# Patient Record
Sex: MALE | Race: WHITE | Employment: UNEMPLOYED | URBAN - METROPOLITAN AREA
[De-identification: names, ages, dates, MRNs, and addresses within clinical notes are randomized per-mention and may not be internally consistent; named-entity substitution may affect disease eponyms.]

---

## 2018-03-14 ENCOUNTER — APPOINTMENT (OUTPATIENT)
Dept: ULTRASOUND IMAGING | Facility: CLINIC | Age: 48
End: 2018-03-14
Attending: EMERGENCY MEDICINE
Payer: COMMERCIAL

## 2018-03-14 ENCOUNTER — HOSPITAL ENCOUNTER (EMERGENCY)
Facility: CLINIC | Age: 48
Discharge: HOME | End: 2018-03-15
Attending: EMERGENCY MEDICINE | Admitting: EMERGENCY MEDICINE
Payer: COMMERCIAL

## 2018-03-14 DIAGNOSIS — M79.661 RIGHT CALF PAIN: ICD-10-CM

## 2018-03-14 PROCEDURE — 93971 EXTREMITY STUDY: CPT | Mod: RT

## 2018-03-14 PROCEDURE — 99285 EMERGENCY DEPT VISIT HI MDM: CPT | Mod: 25

## 2018-03-14 RX ORDER — ACETAMINOPHEN 325 MG/1
650 TABLET ORAL ONCE
Status: COMPLETED | OUTPATIENT
Start: 2018-03-14 | End: 2018-03-15

## 2018-03-14 ASSESSMENT — ENCOUNTER SYMPTOMS: MYALGIAS: 1

## 2018-03-14 NOTE — LETTER
March 15, 2018      To Whom It May Concern:      Karson Ortiz was seen in our Emergency Department today, 03/15/18.  I expect his condition to improve over the next 2 days.  He may return to work/school when improved.    Sincerely,        Odell Perez RN

## 2018-03-14 NOTE — ED AVS SNAPSHOT
[facility] Emergency Department    [address]    [facility] [address]    Phone:  [phone]    Fax:  [phone]                                       Karson Ortiz   MRN: [id number]    Department:  [facility] Emergency Department   Date of Visit:  3/14/2018           Patient Information     Date Of Birth          1970        Your diagnoses for this visit were:     Right calf pain        You were seen by Sherie Bolanos MD.      Follow-up Information     Follow up with [facility], [place] In 3 days.    Specialty:  Family Practice        Follow up with [facility] Emergency Department.    Specialty:  EMERGENCY MEDICINE    Why:  If symptoms worsen    Contact information:    [address]  [facility] [address] [phone]        Discharge Instructions       Use Tylenol or ibuprofen for pain.    Rest and elevate the extremity.  Try ice as well.    Follow-up with your primary care provider, particularly if you are not having improvement in your pain.    Return to the emergency department with new or concerning symptoms.    Discharge References/Attachments     MYALGIAS (ENGLISH)    MUSCLE STRAIN, EXTREMITY (ENGLISH)      24 Hour Appointment Hotline       To make an appointment at any [facility] clinic, call [phone] ([phone]). If you don't have a family doctor or clinic, we will help you find one. [facility] clinics are conveniently located to serve the needs of you and your family.             Review of your medicines      Our records show that you are taking the medicines listed below. If these are incorrect, please call your family doctor or clinic.        Dose / Directions Last dose taken    ANDROGEL TD   Dose:  2 pump        Place 2 pumps onto the skin daily   Refills:  0        azithromycin 500 MG tablet   Commonly known as:  ZITHROMAX   Dose:  500 mg   Quantity:  3 tablet        Take 1 tablet (500 mg) by mouth daily    Refills:  0        cephALEXin 500 MG capsule   Commonly known as:  KEFLEX   Dose:  500 mg   Quantity:  30 capsule        Take 1 capsule (500 mg) by mouth 3 times daily   Refills:  0        diazepam 5 MG tablet   Commonly known as:  VALIUM   Dose:  5 mg   Quantity:  12 tablet        Take 1 tablet (5 mg) by mouth every 6 hours as needed (vertigo)   Refills:  0        GABAPENTIN PO   Dose:  300 mg        Take 300 mg by mouth 3 times daily   Refills:  0        oxyCODONE IR 5 MG tablet   Commonly known as:  ROXICODONE   Dose:  5-10 mg   Quantity:  140 tablet        Take 1-2 tablets (5-10 mg) by mouth every 4 hours as needed for pain (max 10/day)   Refills:  0        oxyCODONE-acetaminophen 5-325 MG per tablet   Commonly known as:  PERCOCET   Dose:  1-2 tablet   Quantity:  15 tablet        Take 1-2 tablets by mouth every 6 hours as needed for pain   Refills:  0        senna-docusate 8.6-50 MG per tablet   Commonly known as:  SENOKOT-S;PERICOLACE   Dose:  1-2 tablet   Quantity:  120 tablet        Take 1-2 tablets by mouth 2 times daily as needed for constipation   Refills:  1                Procedures and tests performed during your visit     Procedure/Test Number of Times Performed    Basic metabolic panel 1    CBC with platelets differential 2    CT Chest Pulmonary Embolism w Contrast 1    Cardiac Continuous Monitoring 1    D dimer quantitative 2    EKG 12-lead, tracing only 1    INR 2    Partial thromboplastin time 2    Peripheral IV catheter 1    Pulse oximetry nursing 1    Troponin I 1    US Lower Extremity Venous Duplex Right 1      Orders Needing Specimen Collection     None      Pending Results     Date and Time Order Name Status Description    3/14/2018 2346 EKG 12-lead, tracing only Preliminary             Pending Culture Results     No orders found for last 3 day(s).            Pending Results Instructions     If you had any lab results that were not finalized at the time of your Discharge, you can call the ED  Lab Result RN at [phone]. You will be contacted by this team for any positive Lab results or changes in treatment. The nurses are available 7 days a week from 10A to 6:30P.  You can leave a message 24 hours per day and they will return your call.        Test Results From Your Hospital Stay        3/15/2018  2:18 AM      Narrative     US LOWER EXTREMITY VENOUS DUPLEX RIGHT 3/15/2018 2:13 AM     HISTORY: Calf pain and history of clots.    TECHNIQUE: Venous Doppler US of the lower extremity.? Color flow and  spectral Doppler with waveform analysis performed.    COMPARISON: None.    FINDINGS: Ultrasound of the right leg demonstrates no deep vein  thrombus from the common femoral through popliteal veins or in the  visualized segments of posterior tibial or peroneal veins in the calf.        Impression     IMPRESSION: No DVT identified right leg.    ELKE PETER MD         3/15/2018  2:08 AM      Narrative     CT CHEST PULMONARY EMBOLISM W CONTRAST  3/15/2018 1:19 AM     HISTORY: Dyspnea. History of cancer, presenting with leg pain. History  of superficial venous thrombosis. Mild chest pain.    TECHNIQUE: Volumetric acquisition of the chest  after the  administration of 79 mL Isovue-370 IV contrast. Radiation dose for  this scan was reduced using automated exposure control, adjustment of  the mA and/or kV according to patient size, or iterative  reconstruction technique.     COMPARISON: 2/25/2014.    FINDINGS: No visualized pulmonary embolism. Normal caliber thoracic  aorta. No acute infiltrates, pleural effusions or pneumothorax. Slight  prominence of interstitial markings likely relating to a somewhat  shallow inspiration. Small left hilar lymph node is stable. Calcified  left hilar and subcarinal nodes. Again noted is a slightly lobulated  lesion involving the posterior pancreatic tail which may be minimally  larger measuring approximately 1.9 cm (previously 1.6 cm). This is  indeterminate and seen on series  4, image 122. A tiny hypodensity more  distally in the pancreatic tail is stable.        Impression     IMPRESSION:  1. No visualized pulmonary embolism or other acute findings in the  chest.  2. Indeterminate pancreatic lesion as previously noted. This may be  minimally more prominent.    ELKE PETER MD         3/15/2018 12:57 AM      Component Results     Component Value Ref Range & Units Status    Sodium 139 133 - 144 mmol/L Final    Potassium 3.9 3.4 - 5.3 mmol/L Final    Chloride 108 94 - 109 mmol/L Final    Carbon Dioxide 25 20 - 32 mmol/L Final    Anion Gap 6 3 - 14 mmol/L Final    Glucose 107 (H) 70 - 99 mg/dL Final    Urea Nitrogen 22 7 - 30 mg/dL Final    Creatinine 0.81 0.66 - 1.25 mg/dL Final    GFR Estimate >90 >60 mL/min/1.7m2 Final    Non  GFR Calc    GFR Estimate If Black >90 >60 mL/min/1.7m2 Final    African American GFR Calc    Calcium 8.4 (L) 8.5 - 10.1 mg/dL Final         3/15/2018 12:49 AM      Component Results     Component Value Ref Range & Units Status    WBC Canceled, Test credited 4.0 - 11.0 10e9/L Final    Unsatisfactory specimen - clotted    RBC Count Canceled, Test credited 4.4 - 5.9 10e12/L Final    Unsatisfactory specimen - clotted    Hemoglobin Canceled, Test credited 13.3 - 17.7 g/dL Final    Unsatisfactory specimen - clotted    Hematocrit Canceled, Test credited 40.0 - 53.0 % Final    Unsatisfactory specimen - clotted    MCV Canceled, Test credited 78 - 100 fl Final    Unsatisfactory specimen - clotted    MCH Canceled, Test credited 26.5 - 33.0 pg Final    Unsatisfactory specimen - clotted    MCHC Canceled, Test credited 31.5 - 36.5 g/dL Final    Unsatisfactory specimen - clotted    RDW Canceled, Test credited 10.0 - 15.0 % Final    Unsatisfactory specimen - clotted    Platelet Count Canceled, Test credited 150 - 450 10e9/L Final    Unsatisfactory specimen - clotted    Diff Method Canceled, Test credited  Final    Unsatisfactory specimen - clotted          3/15/2018  1:26 AM      Component Results     Component Value Ref Range & Units Status    INR Canceled, Test credited 0.86 - 1.14 Final    Unsatisfactory specimen - clotted         3/15/2018  1:26 AM      Component Results     Component Value Ref Range & Units Status    PTT Canceled, Test credited 22 - 37 sec Final    Unsatisfactory specimen - clotted         3/15/2018  1:26 AM      Component Results     Component Value Ref Range & Units Status    D Dimer Canceled, Test credited 0.0 - 0.50 ug/ml FEU Final    Unsatisfactory specimen - clotted         3/15/2018  1:09 AM      Component Results     Component Value Ref Range & Units Status    WBC 9.9 4.0 - 11.0 10e9/L Final    RBC Count 4.99 4.4 - 5.9 10e12/L Final    Hemoglobin 14.7 13.3 - 17.7 g/dL Final    Hematocrit 44.9 40.0 - 53.0 % Final    MCV 90 78 - 100 fl Final    MCH 29.5 26.5 - 33.0 pg Final    MCHC 32.7 31.5 - 36.5 g/dL Final    RDW 12.7 10.0 - 15.0 % Final    Platelet Count 344 150 - 450 10e9/L Final    Diff Method Automated Method  Final    % Neutrophils 57.7 % Final    % Lymphocytes 31.2 % Final    % Monocytes 8.7 % Final    % Eosinophils 1.6 % Final    % Basophils 0.4 % Final    % Immature Granulocytes 0.4 % Final    Nucleated RBCs 0 0 /100 Final    Absolute Neutrophil 5.7 1.6 - 8.3 10e9/L Final    Absolute Lymphocytes 3.1 0.8 - 5.3 10e9/L Final    Absolute Monocytes 0.9 0.0 - 1.3 10e9/L Final    Absolute Eosinophils 0.2 0.0 - 0.7 10e9/L Final    Absolute Basophils 0.0 0.0 - 0.2 10e9/L Final    Abs Immature Granulocytes 0.0 0 - 0.4 10e9/L Final    Absolute Nucleated RBC 0.0  Final         3/15/2018  1:54 AM      Component Results     Component Value Ref Range & Units Status    D Dimer 0.7 (H) 0.0 - 0.50 ug/ml FEU Final    This D-dimer assay is intended for use in conjunction with a clinical pretest   probability assessment model to exclude pulmonary embolism (PE) and deep   venous thrombosis (DVT) in outpatients suspected of PE or DVT. The cut-off    value is 0.5 ug/mL FEU.           3/15/2018  1:54 AM      Component Results     Component Value Ref Range & Units Status    INR 0.95 0.86 - 1.14 Final         3/15/2018  1:54 AM      Component Results     Component Value Ref Range & Units Status    PTT 28 22 - 37 sec Final         3/15/2018  1:49 AM      Component Results     Component Value Ref Range & Units Status    Troponin I ES <0.015 0.000 - 0.045 ug/L Final    The 99th percentile for upper reference range is 0.045 ug/L.  Troponin values   in the range of 0.045 - 0.120 ug/L may be associated with risks of adverse   clinical events.                  Clinical Quality Measure: Blood Pressure Screening     Your blood pressure was checked while you were in the emergency department today. The last reading we obtained was  BP: 123/78 . Please read the guidelines below about what these numbers mean and what you should do about them.  If your systolic blood pressure (the top number) is less than 120 and your diastolic blood pressure (the bottom number) is less than 80, then your blood pressure is normal. There is nothing more that you need to do about it.  If your systolic blood pressure (the top number) is 120-139 or your diastolic blood pressure (the bottom number) is 80-89, your blood pressure may be higher than it should be. You should have your blood pressure rechecked within a year by a primary care provider.  If your systolic blood pressure (the top number) is 140 or greater or your diastolic blood pressure (the bottom number) is 90 or greater, you may have high blood pressure. High blood pressure is treatable, but if left untreated over time it can put you at risk for heart attack, stroke, or kidney failure. You should have your blood pressure rechecked by a primary care provider within the next 4 weeks.  If your provider in the emergency department today gave you specific instructions to follow-up with your doctor or provider even sooner than that, you  "should follow that instruction and not wait for up to 4 weeks for your follow-up visit.        Thank you for choosing [facility]       Thank you for choosing [facility] for your care. Our goal is always to provide you with excellent care. Hearing back from our patients is one way we can continue to improve our services. Please take a few minutes to complete the written survey that you may receive in the mail after you visit with us. Thank you!        [employer] Information     [employer] lets you send messages to your doctor, view your test results, renew your prescriptions, schedule appointments and more. To sign up, go to [email or web address] . Click on \"Log in\" on the left side of the screen, which will take you to the Welcome page. Then click on \"Sign up Now\" on the right side of the page.     You will be asked to enter the access code listed below, as well as some personal information. Please follow the directions to create your username and password.     Your access code is: [email or web address]  Expires: 2018  2:42 AM     Your access code will  in 90 days. If you need help or a new code, please call your [facility] clinic or [phone].        Care EveryWhere ID     This is your Care EveryWhere ID. This could be used by other organizations to access your [facility] medical records  [id number]        Equal Access to Services     SEFERINO HOPKINS : Hadii trip Pacheco, waaxda luqadaha, qaybta kaalmada maycol, levy correa . So [facility] [phone].    ATENCIÓN: Si habla español, tiene a abrams disposición servicios gratuitos de asistencia lingüística. Llame al [phone].    We comply with applicable federal civil rights laws and [place] laws. We do not discriminate on the basis of race, color, national origin, age, disability, sex, sexual orientation, or gender identity.            After Visit Summary       This is your record. Keep this with you and show to your community " pharmacist(s) and doctor(s) at your next visit.

## 2018-03-14 NOTE — ED AVS SNAPSHOT
[facility] Emergency Department    [address]    [facility] [address]    Phone:  [phone]    Fax:  [phone]                                       Karson Ortiz   MRN: [id number]    Department:  [facility] Emergency Department   Date of Visit:  3/14/2018           After Visit Summary Signature Page     I have received my discharge instructions, and my questions have been answered. I have discussed any challenges I see with this plan with the nurse or doctor.    ..........................................................................................................................................  Patient/Patient Representative Signature      ..........................................................................................................................................  Patient Representative Print Name and Relationship to Patient    ..................................................               ................................................  Date                                            Time    ..........................................................................................................................................  Reviewed by Signature/Title    ...................................................              ..............................................  Date                                                            Time

## 2018-03-15 ENCOUNTER — APPOINTMENT (OUTPATIENT)
Dept: CT IMAGING | Facility: CLINIC | Age: 48
End: 2018-03-15
Attending: EMERGENCY MEDICINE
Payer: COMMERCIAL

## 2018-03-15 VITALS
HEART RATE: 101 BPM | BODY MASS INDEX: 34.36 KG/M2 | RESPIRATION RATE: 11 BRPM | TEMPERATURE: 98.2 F | WEIGHT: 240 LBS | HEIGHT: 70 IN | SYSTOLIC BLOOD PRESSURE: 123 MMHG | DIASTOLIC BLOOD PRESSURE: 78 MMHG | OXYGEN SATURATION: 95 %

## 2018-03-15 LAB
ANION GAP SERPL CALCULATED.3IONS-SCNC: 6 MMOL/L (ref 3–14)
APTT PPP: 28 SEC (ref 22–37)
APTT PPP: NORMAL SEC (ref 22–37)
BASOPHILS # BLD AUTO: 0 10E9/L (ref 0–0.2)
BASOPHILS NFR BLD AUTO: 0.4 %
BUN SERPL-MCNC: 22 MG/DL (ref 7–30)
CALCIUM SERPL-MCNC: 8.4 MG/DL (ref 8.5–10.1)
CHLORIDE SERPL-SCNC: 108 MMOL/L (ref 94–109)
CO2 SERPL-SCNC: 25 MMOL/L (ref 20–32)
CREAT SERPL-MCNC: 0.81 MG/DL (ref 0.66–1.25)
D DIMER PPP FEU-MCNC: 0.7 UG/ML FEU (ref 0–0.5)
D DIMER PPP FEU-MCNC: NORMAL UG/ML FEU (ref 0–0.5)
DIFFERENTIAL METHOD BLD: NORMAL
DIFFERENTIAL METHOD BLD: NORMAL
EOSINOPHIL # BLD AUTO: 0.2 10E9/L (ref 0–0.7)
EOSINOPHIL NFR BLD AUTO: 1.6 %
ERYTHROCYTE [DISTWIDTH] IN BLOOD BY AUTOMATED COUNT: 12.7 % (ref 10–15)
ERYTHROCYTE [DISTWIDTH] IN BLOOD BY AUTOMATED COUNT: NORMAL % (ref 10–15)
GFR SERPL CREATININE-BSD FRML MDRD: >90 ML/MIN/1.7M2
GLUCOSE SERPL-MCNC: 107 MG/DL (ref 70–99)
HCT VFR BLD AUTO: 44.9 % (ref 40–53)
HCT VFR BLD AUTO: NORMAL % (ref 40–53)
HGB BLD-MCNC: 14.7 G/DL (ref 13.3–17.7)
HGB BLD-MCNC: NORMAL G/DL (ref 13.3–17.7)
IMM GRANULOCYTES # BLD: 0 10E9/L (ref 0–0.4)
IMM GRANULOCYTES NFR BLD: 0.4 %
INR PPP: 0.95 (ref 0.86–1.14)
INR PPP: NORMAL (ref 0.86–1.14)
INTERPRETATION ECG - MUSE: NORMAL
LYMPHOCYTES # BLD AUTO: 3.1 10E9/L (ref 0.8–5.3)
LYMPHOCYTES NFR BLD AUTO: 31.2 %
MCH RBC QN AUTO: 29.5 PG (ref 26.5–33)
MCH RBC QN AUTO: NORMAL PG (ref 26.5–33)
MCHC RBC AUTO-ENTMCNC: 32.7 G/DL (ref 31.5–36.5)
MCHC RBC AUTO-ENTMCNC: NORMAL G/DL (ref 31.5–36.5)
MCV RBC AUTO: 90 FL (ref 78–100)
MCV RBC AUTO: NORMAL FL (ref 78–100)
MONOCYTES # BLD AUTO: 0.9 10E9/L (ref 0–1.3)
MONOCYTES NFR BLD AUTO: 8.7 %
NEUTROPHILS # BLD AUTO: 5.7 10E9/L (ref 1.6–8.3)
NEUTROPHILS NFR BLD AUTO: 57.7 %
NRBC # BLD AUTO: 0 10*3/UL
NRBC BLD AUTO-RTO: 0 /100
PLATELET # BLD AUTO: 344 10E9/L (ref 150–450)
PLATELET # BLD AUTO: NORMAL 10E9/L (ref 150–450)
POTASSIUM SERPL-SCNC: 3.9 MMOL/L (ref 3.4–5.3)
RBC # BLD AUTO: 4.99 10E12/L (ref 4.4–5.9)
RBC # BLD AUTO: NORMAL 10E12/L (ref 4.4–5.9)
SODIUM SERPL-SCNC: 139 MMOL/L (ref 133–144)
TROPONIN I SERPL-MCNC: <0.015 UG/L (ref 0–0.04)
WBC # BLD AUTO: 9.9 10E9/L (ref 4–11)
WBC # BLD AUTO: NORMAL 10E9/L (ref 4–11)

## 2018-03-15 PROCEDURE — 71260 CT THORAX DX C+: CPT

## 2018-03-15 PROCEDURE — 85730 THROMBOPLASTIN TIME PARTIAL: CPT | Performed by: EMERGENCY MEDICINE

## 2018-03-15 PROCEDURE — 93005 ELECTROCARDIOGRAM TRACING: CPT

## 2018-03-15 PROCEDURE — 25000128 H RX IP 250 OP 636: Performed by: EMERGENCY MEDICINE

## 2018-03-15 PROCEDURE — 85610 PROTHROMBIN TIME: CPT | Performed by: EMERGENCY MEDICINE

## 2018-03-15 PROCEDURE — 84484 ASSAY OF TROPONIN QUANT: CPT | Performed by: EMERGENCY MEDICINE

## 2018-03-15 PROCEDURE — 80048 BASIC METABOLIC PNL TOTAL CA: CPT | Performed by: EMERGENCY MEDICINE

## 2018-03-15 PROCEDURE — 85025 COMPLETE CBC W/AUTO DIFF WBC: CPT | Performed by: EMERGENCY MEDICINE

## 2018-03-15 PROCEDURE — 36415 COLL VENOUS BLD VENIPUNCTURE: CPT | Performed by: EMERGENCY MEDICINE

## 2018-03-15 PROCEDURE — 25000132 ZZH RX MED GY IP 250 OP 250 PS 637: Performed by: EMERGENCY MEDICINE

## 2018-03-15 PROCEDURE — 85379 FIBRIN DEGRADATION QUANT: CPT | Performed by: EMERGENCY MEDICINE

## 2018-03-15 RX ORDER — IOPAMIDOL 755 MG/ML
500 INJECTION, SOLUTION INTRAVASCULAR ONCE
Status: COMPLETED | OUTPATIENT
Start: 2018-03-15 | End: 2018-03-15

## 2018-03-15 RX ADMIN — IOPAMIDOL 79 ML: 755 INJECTION, SOLUTION INTRAVENOUS at 01:10

## 2018-03-15 RX ADMIN — ACETAMINOPHEN 650 MG: 325 TABLET, FILM COATED ORAL at 00:29

## 2018-03-15 RX ADMIN — SODIUM CHLORIDE 60 ML: 9 INJECTION, SOLUTION INTRAVENOUS at 01:10

## 2018-03-15 ASSESSMENT — ENCOUNTER SYMPTOMS: SHORTNESS OF BREATH: 1

## 2018-03-15 NOTE — ED PROVIDER NOTES
History     Chief Complaint:  Leg pain    The history is provided by the patient.      Karson Ortiz is a 47 year old male with a history of neuroendocrine carcinoma of the pancrease who presents with leg pain. The patient has a history of superficial venous thrombosis in his right leg two years ago that was not anticoagulated, and now presents for similar leg pain. The patient reports that he awoke from his sleep this morning due to right calf pain. This has persisted since that time and upon arrival here in the ED he describes this pain as a moderate intensity feeling of tightness in his leg without radiation. He also states that he is currently experiencing very mild dyspnea at this time, but that this may be due to anxiety. He denies chest pain for me (though endorsed to nursing staff). He has taken ibuprofen with little alleviation of his pain. No trauma. No recent immobilization or surgeries. Nonsmoker.     Cardiac/PE/DVT Risk Factors:  History of hypertension - No   History of hyperlipidemia - No  History of diabetes - Yes  History of smoking - No  Personal history of PE/DVT - No  Family history of PE/DVT - No  Family history of heart complications - No  Recent travel - No  Recent surgery - No  Other immobilizations - No  Cancer - Yes    Allergies:  No Known Drug Allergies     Medications:    Valium  Percocet  Zithromax  Roxicodone  Keflex  Senokot-S  Gabapentin  Androgel    Past Medical History:    Cancer  Diabetes    Past Surgical History:    Discectomy, laminectomy  Lumbar fusion  Orthopedic surgery, shoulder, bilateral  Orthopedic surgery, left knee x3  Carpal tunnel release    Family History:    The patient denies any relevant family medical history.    Social History:  Smoking Status: No  Smokeless Tobacco: Yes  Alcohol Use: Yes  Marital Status:        Review of Systems   Respiratory: Positive for shortness of breath.    Cardiovascular: Positive for chest pain. Negative for leg swelling.  "  Musculoskeletal: Positive for myalgias.   All other systems reviewed and are negative.    Physical Exam   Vitals:  Patient Vitals for the past 24 hrs:   BP Temp Temp src Pulse Heart Rate Resp SpO2 Height Weight   03/15/18 0220 123/78 - - - 90 - 95 % - -   03/15/18 0100 - - - - 89 11 96 % - -   03/15/18 0059 - - - - - 17 95 % - -   03/15/18 0058 - - - - - 9 94 % - -   03/15/18 0045 - - - - 92 - 96 % - -   03/15/18 0030 - - - - 87 - 95 % - -   03/15/18 0027 129/90 - - - - - 95 % - -   03/14/18 2241 (!) 169/108 98.2  F (36.8  C) Oral 101 101 18 98 % 1.778 m (5' 10\") 108.9 kg (240 lb)     Physical Exam  General: Well-developed and well-nourished; well appearing middle aged  man; cooperative  Head:  Atraumatic  Eyes:  Conjunctivae, lids, and sclerae are normal  ENT:    Normal nose; moist mucous membranes  Neck:  Supple; normal range of motion  CV:  Regular rate and rhythm; normal heart sounds with no murmurs, rubs, or gallops detected  Resp:  No respiratory distress; clear to auscultation bilaterally without decreased breath sounds, wheezing, rales, or rhonchi  GI:  Soft; non-distended; non-tender    MS:  Normal ROM; no bilateral lower extremity edema; mild right calf tenderness; compartments soft; 2+ DP pulse and sensation intact to light touch; normal strength with no overlying skin changes  Skin:  Warm; non-diaphoretic; no pallor  Neuro:  Awake; A&Ox3; normal strength  Psych: Normal mood and affect; normal speech  Vitals reviewed.    Emergency Department Course     EKG  Time: 0031  Rate 79 bpm. MD interval 138. QRS duration 88. QT/QTc 364/417.   Normal sinus rhythm  Normal ECG   No acute ST changes.  No prior EKG for comparison.     Imaging:  Radiology findings were communicated with the patient who voiced understanding of the findings.  CT Chest Pulmonary Embolism w contrast:  1. No visualized pulmonary embolism or other acute findings in the chest.  2. Indeterminate pancreatic lesion as previously noted. " This may be minimally more prominent.  Per Radiologist.     US Lower Extremity Venous Duplex Right:  No DVT identified in right leg.  Per Radiologist.     Laboratory:  Laboratory findings were communicated with the patient who voiced understanding of the findings.  BMP: Glucose: 107 (H), Calcium: 8.4 (L) o/w WNL (Creatinine 0.81)  CBC: AWNL. (WBC 9.9, HGB 14.7, )   INR: 0.95  PTT: 28  D Dimer (Collected 0135): 0.7 (H)  Troponin (Collected 0030): <0.015    Interventions:  0029 Acetaminophen, 650 mg, PO     Emergency Department Course:  Nursing notes and vitals reviewed.  2337 I had my initial encounter with the patient.  I performed an exam of the patient as documented above.   IV was inserted and blood was drawn for laboratory testing, results above.  The patient was sent for a CT and US while in the emergency department, results above.   The patient understands his results and is comfortable with discharge. He states that he will be okay with Tylenol rather than something stronger.    0226 I discussed the treatment plan with the patient. He expressed understanding of this plan and consented to discharge. He will be discharged home with instructions for care and follow up. In addition, the patient will return to the emergency department if his symptoms persist, worsen, if new symptoms arise or if there is any concern.  All questions were answered.    Impression & Plan      Medical Decision Making:  Karson is a 47-year-old male who has neuroendocrine carcinoma of the pancreas who presents with right calf pain starting this morning. Patient states he has a history of superficial clot in this lower extremity that he was not anticoagulated for. Thus, he is concerned for return of thrombosis. Initially patient told nursing staff he had no chest pain or shortness of breath but for me he endorses mild shortness of breath and later for nursing staff he endorsed chest pain. Thus, patient underwent evaluation for both  DVT and PE. Of note, initial heart rate was 101 with no tachypnea or hypoxia.    Troponin is negative and EKG is reassuring without acute ST changes or arrhythmias. No evidence of right heart strain. CBC, BMP, coagulation studies are all unremarkable. D-dimer is positive at 0.7. However, fortunately, DVT study of the right lower extremity is negative and CT chest shows no evidence of pulmonary embolus. There are no other acute intrathoracic findings. He was given Tylenol for his pain during his stay though on repeat evaluation he states this did not significantly improve it. However, he states his pain is tolerable and declines offer for stronger pain medications. I discussed the results of the laboratory and imaging studies with him and provided reassurance. He has no evidence of cellulitis to this extremity in his compartments are soft - not consistent with compartment syndrome. He has no trauma no bony tenderness so further imaging is not indicated. Exact etiology of his pain is unclear though there appears to be no emergent process. I recommended he continue Tylenol or ibuprofen as pain for pain. I recommended he rest and elevate the extremity and try ice as well. Patient should follow-up with his primary care provider if he is not improving and I provided return precautions. I answered all his questions and verbalized understanding. Amenable to discharge.    Diagnosis:    ICD-10-CM    1. Right calf pain M79.661      Disposition:   Discharged    Scribe Disclosure:  I, Regulo Olivier, am serving as a scribe at 11:36 PM on 3/14/2018 to document services personally performed by Sherie Bolanos MD, based on my observations and the provider's statements to me.    3/14/2018   [facility] EMERGENCY DEPARTMENT       Sherie Bolanos MD  03/15/18 1285

## 2018-03-15 NOTE — DISCHARGE INSTRUCTIONS
Use Tylenol or ibuprofen for pain.    Rest and elevate the extremity.  Try ice as well.    Follow-up with your primary care provider, particularly if you are not having improvement in your pain.    Return to the emergency department with new or concerning symptoms.

## 2018-03-15 NOTE — ED NOTES
Pt verbalizes understanding of D/C plan, follow up and S/S to return to ER for. Pt D/C ambulating well

## 2018-03-15 NOTE — ED NOTES
Patient presents with complaints of right leg pain which started this morning. Patient states that two years ago he had a blood clot in the same leg and was concerned that he may have another. ABC intact without need for intervention. No recent long trips. ABC intact without need for intervention.

## 2019-10-02 ENCOUNTER — HEALTH MAINTENANCE LETTER (OUTPATIENT)
Age: 49
End: 2019-10-02

## 2021-01-15 ENCOUNTER — HEALTH MAINTENANCE LETTER (OUTPATIENT)
Age: 51
End: 2021-01-15

## 2021-09-04 ENCOUNTER — HEALTH MAINTENANCE LETTER (OUTPATIENT)
Age: 51
End: 2021-09-04

## 2022-02-19 ENCOUNTER — HEALTH MAINTENANCE LETTER (OUTPATIENT)
Age: 52
End: 2022-02-19

## 2022-10-22 ENCOUNTER — HEALTH MAINTENANCE LETTER (OUTPATIENT)
Age: 52
End: 2022-10-22

## 2023-04-01 ENCOUNTER — HEALTH MAINTENANCE LETTER (OUTPATIENT)
Age: 53
End: 2023-04-01